# Patient Record
Sex: FEMALE | Race: WHITE | NOT HISPANIC OR LATINO | URBAN - METROPOLITAN AREA
[De-identification: names, ages, dates, MRNs, and addresses within clinical notes are randomized per-mention and may not be internally consistent; named-entity substitution may affect disease eponyms.]

---

## 2020-07-25 ENCOUNTER — TELEPHONE ENCOUNTER (OUTPATIENT)
Dept: URBAN - METROPOLITAN AREA CLINIC 13 | Facility: CLINIC | Age: 35
End: 2020-07-25

## 2020-07-26 ENCOUNTER — TELEPHONE ENCOUNTER (OUTPATIENT)
Dept: URBAN - METROPOLITAN AREA CLINIC 13 | Facility: CLINIC | Age: 35
End: 2020-07-26

## 2020-07-26 RX ORDER — LINACLOTIDE 145 UG/1
TAKE 1 CAPSULE 30 MINUTES BEFORE BREAKFAST CAPSULE, GELATIN COATED ORAL
Qty: 30 | Refills: 5 | Status: ACTIVE | COMMUNITY
Start: 2015-05-22

## 2021-03-25 ENCOUNTER — OFFICE VISIT (OUTPATIENT)
Dept: URBAN - METROPOLITAN AREA CLINIC 113 | Facility: CLINIC | Age: 36
End: 2021-03-25

## 2021-05-18 ENCOUNTER — WEB ENCOUNTER (OUTPATIENT)
Dept: URBAN - METROPOLITAN AREA CLINIC 113 | Facility: CLINIC | Age: 36
End: 2021-05-18

## 2021-05-18 ENCOUNTER — OFFICE VISIT (OUTPATIENT)
Dept: URBAN - METROPOLITAN AREA CLINIC 113 | Facility: CLINIC | Age: 36
End: 2021-05-18
Payer: COMMERCIAL

## 2021-05-18 ENCOUNTER — LAB OUTSIDE AN ENCOUNTER (OUTPATIENT)
Dept: URBAN - METROPOLITAN AREA CLINIC 113 | Facility: CLINIC | Age: 36
End: 2021-05-18

## 2021-05-18 VITALS
TEMPERATURE: 98.7 F | DIASTOLIC BLOOD PRESSURE: 87 MMHG | SYSTOLIC BLOOD PRESSURE: 131 MMHG | HEIGHT: 64 IN | HEART RATE: 81 BPM | RESPIRATION RATE: 20 BRPM | BODY MASS INDEX: 29.71 KG/M2 | WEIGHT: 174 LBS

## 2021-05-18 DIAGNOSIS — R10.32 LEFT LOWER QUADRANT PAIN: ICD-10-CM

## 2021-05-18 DIAGNOSIS — R11.0 NAUSEA: ICD-10-CM

## 2021-05-18 DIAGNOSIS — K92.1 HEMATOCHEZIA: ICD-10-CM

## 2021-05-18 DIAGNOSIS — K59.01 CONSTIPATION BY DELAYED COLONIC TRANSIT: ICD-10-CM

## 2021-05-18 PROCEDURE — 99204 OFFICE O/P NEW MOD 45 MIN: CPT | Performed by: INTERNAL MEDICINE

## 2021-05-18 RX ORDER — LINACLOTIDE 145 UG/1
TAKE 1 CAPSULE 30 MINUTES BEFORE BREAKFAST CAPSULE, GELATIN COATED ORAL
Qty: 30 | Refills: 5 | Status: DISCONTINUED | COMMUNITY
Start: 2015-05-22

## 2021-05-18 RX ORDER — SODIUM, POTASSIUM,MAG SULFATES 17.5-3.13G
354ML SOLUTION, RECONSTITUTED, ORAL ORAL
Qty: 354 MILLILITER | Refills: 0 | OUTPATIENT
Start: 2021-05-18 | End: 2021-05-19

## 2021-05-18 RX ORDER — PLECANATIDE 3 MG/1
1 TABLET TABLET ORAL ONCE A DAY
Qty: 90 | Refills: 3 | OUTPATIENT

## 2021-05-18 RX ORDER — HYOSCYAMINE SULFATE 0.12 MG/1
1 TABLET UNDER THE TONGUE AND ALLOW TO DISSOLVE  AS NEEDED TABLET, ORALLY DISINTEGRATING ORAL
Qty: 40 | Refills: 3 | OUTPATIENT

## 2021-05-18 NOTE — HPI-TODAY'S VISIT:
Patient presents today for initial evaluation.  She reports a long history of GI problems dating back multiple years.  She describes constipation and intermittent rectal bleeding.  She has chronic nausea.  She states she is tried everything for her bowel movements.  This sounds to be over-the-counter medicine such as MiraLAX, stool softeners and milk of magnesia.  She has tried Linzess in the past.  She has been having more bleeding recently.  Described as red blood.  She can go up to 5 or 6 days without a bowel movement.  She feels better when she has a bowel movement.  She tells me she would prefer to have diarrhea at this point.  She is not losing weight.  She does have chronic pain in the left lower quadrant which comes and goes.  No clear precipitant.  Some improvement with defecation as noted.  She denies any fevers or chills.  No family history of colon cancer.  She had an upper and lower endoscopy a few years ago which she states were unremarkable with the exception of a hiatal hernia.  I do not have those records.

## 2021-05-19 ENCOUNTER — TELEPHONE ENCOUNTER (OUTPATIENT)
Dept: URBAN - METROPOLITAN AREA CLINIC 113 | Facility: CLINIC | Age: 36
End: 2021-05-19

## 2021-05-27 ENCOUNTER — OFFICE VISIT (OUTPATIENT)
Dept: URBAN - METROPOLITAN AREA SURGERY CENTER 25 | Facility: SURGERY CENTER | Age: 36
End: 2021-05-27
Payer: COMMERCIAL

## 2021-05-27 ENCOUNTER — CLAIMS CREATED FROM THE CLAIM WINDOW (OUTPATIENT)
Dept: URBAN - METROPOLITAN AREA CLINIC 4 | Facility: CLINIC | Age: 36
End: 2021-05-27
Payer: COMMERCIAL

## 2021-05-27 DIAGNOSIS — K62.5 ANAL BLEEDING: ICD-10-CM

## 2021-05-27 DIAGNOSIS — K63.89 STENOSIS OF ILEOCECAL VALVE: ICD-10-CM

## 2021-05-27 DIAGNOSIS — K63.5 BENIGN COLON POLYP: ICD-10-CM

## 2021-05-27 PROCEDURE — 45385 COLONOSCOPY W/LESION REMOVAL: CPT | Performed by: INTERNAL MEDICINE

## 2021-05-27 PROCEDURE — G8907 PT DOC NO EVENTS ON DISCHARG: HCPCS | Performed by: INTERNAL MEDICINE

## 2021-05-27 PROCEDURE — 88305 TISSUE EXAM BY PATHOLOGIST: CPT | Performed by: PATHOLOGY

## 2021-05-27 RX ORDER — PLECANATIDE 3 MG/1
1 TABLET TABLET ORAL ONCE A DAY
Qty: 90 | Refills: 3 | Status: ACTIVE | COMMUNITY

## 2021-05-27 RX ORDER — HYOSCYAMINE SULFATE 0.12 MG/1
1 TABLET UNDER THE TONGUE AND ALLOW TO DISSOLVE  AS NEEDED TABLET, ORALLY DISINTEGRATING ORAL
Qty: 40 | Refills: 3 | Status: ACTIVE | COMMUNITY

## 2021-06-01 ENCOUNTER — TELEPHONE ENCOUNTER (OUTPATIENT)
Dept: URBAN - METROPOLITAN AREA CLINIC 113 | Facility: CLINIC | Age: 36
End: 2021-06-01

## 2021-06-15 ENCOUNTER — TELEPHONE ENCOUNTER (OUTPATIENT)
Dept: URBAN - METROPOLITAN AREA CLINIC 113 | Facility: CLINIC | Age: 36
End: 2021-06-15

## 2021-07-27 ENCOUNTER — OFFICE VISIT (OUTPATIENT)
Dept: URBAN - METROPOLITAN AREA CLINIC 113 | Facility: CLINIC | Age: 36
End: 2021-07-27
Payer: COMMERCIAL

## 2021-07-27 VITALS
RESPIRATION RATE: 18 BRPM | HEIGHT: 64 IN | HEART RATE: 76 BPM | BODY MASS INDEX: 29.19 KG/M2 | WEIGHT: 171 LBS | DIASTOLIC BLOOD PRESSURE: 81 MMHG | TEMPERATURE: 98.2 F | SYSTOLIC BLOOD PRESSURE: 128 MMHG

## 2021-07-27 DIAGNOSIS — K59.01 CONSTIPATION BY DELAYED COLONIC TRANSIT: ICD-10-CM

## 2021-07-27 DIAGNOSIS — K92.1 HEMATOCHEZIA: ICD-10-CM

## 2021-07-27 DIAGNOSIS — K60.2 ANAL FISSURE: ICD-10-CM

## 2021-07-27 DIAGNOSIS — R10.32 LEFT LOWER QUADRANT PAIN: ICD-10-CM

## 2021-07-27 PROBLEM — 405729008 HEMATOCHEZIA: Status: ACTIVE | Noted: 2021-07-27

## 2021-07-27 PROBLEM — 30037006: Status: ACTIVE | Noted: 2021-07-27

## 2021-07-27 PROCEDURE — 99213 OFFICE O/P EST LOW 20 MIN: CPT | Performed by: INTERNAL MEDICINE

## 2021-07-27 RX ORDER — PLECANATIDE 3 MG/1
1 TABLET TABLET ORAL ONCE A DAY
Qty: 90 | Refills: 3 | Status: ACTIVE | COMMUNITY

## 2021-07-27 RX ORDER — LUBIPROSTONE 8 UG/1
1 CAPSULE WITH FOOD AND WATER CAPSULE, GELATIN COATED ORAL TWICE A DAY
Qty: 180 CAPSULE | Refills: 3 | OUTPATIENT
Start: 2021-07-27 | End: 2022-07-22

## 2021-07-27 RX ORDER — HYOSCYAMINE SULFATE 0.12 MG/1
1 TABLET UNDER THE TONGUE AND ALLOW TO DISSOLVE  AS NEEDED TABLET, ORALLY DISINTEGRATING ORAL
Qty: 40 | Refills: 3 | Status: ACTIVE | COMMUNITY

## 2021-07-27 NOTE — HPI-TODAY'S VISIT:
36-year-old woman with a longstanding history of GI problems dating back multiple years associated with constipation and intermittent rectal bleeding, presenting for follow-up after a diagnostic colonoscopy.  Irritable bowel syndrome with constipation was felt to be likely. A colonoscopy was performed on 5/27/2021.  Good bowel preparation.  An anal fissure was found on perianal exam. (2) 5 to 8 mm polyps were removed from the sigmoid colon.  Pathology revealed hyperplastic tissue.  Repeat colonoscopy is recommended in 10 years as part of colon cancer prevention.  She reports that she is not feeling well today. She describes LUQ pain, nausea and vomiting. She has bowel movements every three or 4 days. She complains that the Trulance made her feel worse. She has since stopped this. She has tried Linzess in the past, maybe 5 or 6 years ago, without any improvement in bowel habits. She takes a stool softener to produce a bowel movement. She reports that MiraLAX causes her to feel very bloated, but does not allow for a bowel movement.

## 2021-08-19 ENCOUNTER — TELEPHONE ENCOUNTER (OUTPATIENT)
Dept: URBAN - METROPOLITAN AREA CLINIC 40 | Facility: CLINIC | Age: 36
End: 2021-08-19

## 2021-09-10 ENCOUNTER — DASHBOARD ENCOUNTERS (OUTPATIENT)
Age: 36
End: 2021-09-10

## 2021-09-10 ENCOUNTER — WEB ENCOUNTER (OUTPATIENT)
Dept: URBAN - METROPOLITAN AREA CLINIC 107 | Facility: CLINIC | Age: 36
End: 2021-09-10

## 2021-09-10 ENCOUNTER — OFFICE VISIT (OUTPATIENT)
Dept: URBAN - METROPOLITAN AREA CLINIC 107 | Facility: CLINIC | Age: 36
End: 2021-09-10
Payer: COMMERCIAL

## 2021-09-10 VITALS
HEIGHT: 64 IN | HEART RATE: 80 BPM | TEMPERATURE: 98.5 F | WEIGHT: 171 LBS | RESPIRATION RATE: 18 BRPM | SYSTOLIC BLOOD PRESSURE: 133 MMHG | BODY MASS INDEX: 29.19 KG/M2 | DIASTOLIC BLOOD PRESSURE: 98 MMHG

## 2021-09-10 DIAGNOSIS — R14.0 BLOATING: ICD-10-CM

## 2021-09-10 DIAGNOSIS — K59.01 CONSTIPATION BY DELAYED COLONIC TRANSIT: ICD-10-CM

## 2021-09-10 DIAGNOSIS — R11.0 NAUSEA: ICD-10-CM

## 2021-09-10 DIAGNOSIS — R10.32 LEFT LOWER QUADRANT PAIN: ICD-10-CM

## 2021-09-10 PROBLEM — 422587007 NAUSEA: Status: ACTIVE | Noted: 2021-09-10

## 2021-09-10 PROBLEM — 35298007: Status: ACTIVE | Noted: 2021-05-18

## 2021-09-10 PROBLEM — 116289008: Status: ACTIVE | Noted: 2021-09-10

## 2021-09-10 PROBLEM — 301716002 LEFT LOWER QUADRANT PAIN: Status: ACTIVE | Noted: 2021-07-27

## 2021-09-10 PROCEDURE — 99213 OFFICE O/P EST LOW 20 MIN: CPT | Performed by: INTERNAL MEDICINE

## 2021-09-10 RX ORDER — PLECANATIDE 3 MG/1
1 TABLET TABLET ORAL ONCE A DAY
Qty: 90 | Refills: 3 | Status: ON HOLD | COMMUNITY

## 2021-09-10 RX ORDER — HYOSCYAMINE SULFATE 0.38 MG/1
1 TABLET TABLET ORAL
Status: ACTIVE | COMMUNITY

## 2021-09-10 RX ORDER — HYOSCYAMINE SULFATE 0.12 MG/1
1 TABLET UNDER THE TONGUE AND ALLOW TO DISSOLVE  AS NEEDED TABLET, ORALLY DISINTEGRATING ORAL
Qty: 40 | Refills: 3 | Status: ON HOLD | COMMUNITY

## 2021-09-10 RX ORDER — LUBIPROSTONE 8 UG/1
1 CAPSULE WITH FOOD AND WATER CAPSULE, GELATIN COATED ORAL TWICE A DAY
Qty: 180 CAPSULE | Refills: 3 | Status: ON HOLD | COMMUNITY
Start: 2021-07-27 | End: 2022-07-22

## 2021-09-10 NOTE — HPI-TODAY'S VISIT:
This is a 36-year-old female with a longstanding history of GI problems dating back multiple years associated with constipation and intermittent rectal bleeding, presenting for follow-up regarding abdominal pain. She was last seen in the office on 7/27/2021 with complaints of left upper quadrant abdominal pain, nausea and vomiting.  She was having bowel movements every 3 or 4 days.  She was not using Trulance because she felt it made her feel worse.  She had tried Linzess in the past maybe 5 or 6 years ago without any improvement in her bowel habits.  She reported that MiraLAX made her feel bloated and did not allow for a bowel movement.  A recent colonoscopy was unremarkable other than an anal fissure, which was treated topically.  I gave her a prescription for Amitiza to try for bowel maintenance, which she stopped after 3 weeks because it was not helping her bowels move.  She was recommended a bottle of magnesium citrate, and then MiraLAX 17 g twice daily with milk of magnesia 1 tablespoon nightly to every other night for bowel maintenance.  When she was called to be given this recommendation, she apparently argued with our staff that MiraLAX and magnesium citrate only made her stomach hurt worse and refused to take it.  She wanted to know if there were any other suggestions.  She tells me that she is taking a probiotic for constipation, which is allowing her to have a bowel movement daily. She continues to have abdominal pain on the left side. There is nausea but not vomiting. She did have small volume blood per rectum on the toilet tissue this morning. Her stools are normal form. She is taking fiber gummies daily. There is bloating and gas daily. She tells me that her stomach bloats after meals. She is constantly belching or passing gas. She is eating a variety of different foods. She thinks pastas and breads can make her bloating worse. She is eating salads, cabbage, tacos, etc. Abdominal pain can improve with passing gas or belching.

## 2021-09-28 ENCOUNTER — OFFICE VISIT (OUTPATIENT)
Dept: URBAN - METROPOLITAN AREA CLINIC 113 | Facility: CLINIC | Age: 36
End: 2021-09-28

## 2021-10-19 ENCOUNTER — OFFICE VISIT (OUTPATIENT)
Dept: URBAN - METROPOLITAN AREA CLINIC 113 | Facility: CLINIC | Age: 36
End: 2021-10-19

## 2023-08-11 NOTE — PHYSICAL EXAM MUSCULOSKELETAL:
no tenderness or deformities present Birth Control Pills Pregnancy And Lactation Text: This medication should be avoided if pregnant and for the first 30 days post-partum.